# Patient Record
Sex: FEMALE | Race: BLACK OR AFRICAN AMERICAN
[De-identification: names, ages, dates, MRNs, and addresses within clinical notes are randomized per-mention and may not be internally consistent; named-entity substitution may affect disease eponyms.]

---

## 2017-03-04 NOTE — RAD
TWO VIEWS CHEST:

3/4/17

 

PROVIDED CLINICAL HISTORY:  

Cough.

 

FINDINGS:  

Comparison is made with the study dated 6/1/10. 

 

The cardiac and mediastinal silhouette is within normal limits. No focal consolidation, pleural flui
d, or pneumothorax apparent. Multiple bridging osteophytes involving the thoracic spine redemonstrat
ed, suggesting changes of DISH. 

 

IMPRESSION:  

No evidence for an acute cardiopulmonary process.

 

POS: [facility]

## 2017-07-11 NOTE — RAD
LEFT LEG TWO VIEWS:

 

History: Injury. 

 

FINDINGS: 

There are post op changes of total knee replacement. No acute fracture or dislocation is seen. 

 

POS: [facility]

## 2017-07-21 NOTE — RAD
LEFT ANKLE 3 VIEWS:

 

HISTORY: 

Fall, left ankle pain.

 

FINDINGS/IMPRESSION: 

No acute fracture or dislocation is seen.  The ankle mortise is maintained.  Soft tissue swelling is
 present.

 

POS: [facility]

## 2018-01-19 ENCOUNTER — HOSPITAL ENCOUNTER (OUTPATIENT)
Dept: HOSPITAL 92 - BICMAMMO | Age: 68
Discharge: HOME | End: 2018-01-19
Payer: MEDICARE

## 2018-01-19 DIAGNOSIS — N63.20: Primary | ICD-10-CM

## 2018-01-19 DIAGNOSIS — R92.8: ICD-10-CM

## 2018-01-19 PROCEDURE — G0279 TOMOSYNTHESIS, MAMMO: HCPCS

## 2018-01-19 PROCEDURE — 76642 ULTRASOUND BREAST LIMITED: CPT

## 2018-03-05 ENCOUNTER — HOSPITAL ENCOUNTER (OUTPATIENT)
Dept: HOSPITAL 9 - MADRAD | Age: 68
Discharge: HOME | End: 2018-03-05
Attending: FAMILY MEDICINE
Payer: MEDICARE

## 2018-03-05 DIAGNOSIS — M16.11: ICD-10-CM

## 2018-03-05 DIAGNOSIS — M25.551: Primary | ICD-10-CM

## 2018-03-05 DIAGNOSIS — M25.512: ICD-10-CM

## 2018-03-05 NOTE — RAD
LEFT SHOULDER 3 VIEWS:

 

HISTORY: 

Pain.  No trauma.

 

COMPARISON: 

None.

 

FINDINGS: 

There is no significant degenerative change.  No fracture or dislocation with respect to the proximal
 humerus.  There is a horizontally oriented lucency involving the scapula.  Findings may represent a 
nutrient channel.  Reference made to a chest radiograph from 3/4/17 does not demonstrate any similar 
finding.  Given the history of pain, the possibility of a nondisplaced fracture cannot be excluded.  
Better interrogation with CT is recommended.

 

IMPRESSION: 

Lucency involving the left scapula/glenoid.  Better interrogation with CT is recommended.

 

CODE T

 

POS: [facility]

## 2018-03-05 NOTE — RAD
RIGHT HIP:

Three views.

 

HISTORY:

Right hip pain for 2 days.

 

FINDINGS:

There are mild degenerative changes at the hip with mild spurring from the femoral head to mild media
l joint narrowing.  No fracture or focal osseous lesion seen.

 

IMPRESSION: 

Evidence of mild degenerative changes involving the right hip.  

 

POS: [facility]

## 2018-03-23 ENCOUNTER — HOSPITAL ENCOUNTER (EMERGENCY)
Dept: HOSPITAL 9 - MADERS | Age: 68
Discharge: HOME | End: 2018-03-23
Payer: MEDICARE

## 2018-03-23 DIAGNOSIS — E11.9: ICD-10-CM

## 2018-03-23 DIAGNOSIS — Z79.899: ICD-10-CM

## 2018-03-23 DIAGNOSIS — M17.11: Primary | ICD-10-CM

## 2018-03-23 DIAGNOSIS — I10: ICD-10-CM

## 2018-03-23 DIAGNOSIS — Z79.84: ICD-10-CM

## 2018-03-23 DIAGNOSIS — K21.9: ICD-10-CM

## 2018-03-23 PROCEDURE — 20610 DRAIN/INJ JOINT/BURSA W/O US: CPT

## 2018-04-07 ENCOUNTER — HOSPITAL ENCOUNTER (EMERGENCY)
Dept: HOSPITAL 9 - MADERS | Age: 68
Discharge: HOME | End: 2018-04-07
Payer: MEDICARE

## 2018-04-07 DIAGNOSIS — M62.838: Primary | ICD-10-CM

## 2018-04-07 DIAGNOSIS — Z79.82: ICD-10-CM

## 2018-04-07 DIAGNOSIS — K21.9: ICD-10-CM

## 2018-04-07 DIAGNOSIS — Z79.84: ICD-10-CM

## 2018-04-07 DIAGNOSIS — I10: ICD-10-CM

## 2018-04-07 DIAGNOSIS — Z79.899: ICD-10-CM

## 2018-04-07 DIAGNOSIS — E11.9: ICD-10-CM

## 2018-04-07 PROCEDURE — 71046 X-RAY EXAM CHEST 2 VIEWS: CPT

## 2018-04-07 NOTE — RAD
PA AND LATERAL CHEST X-RAY

4/7/18

 

HISTORY: 

Cough with left shoulder pain and neck pain. 

 

COMPARISON:  

3/4/17.

 

FINDINGS:  

The cardiac silhouette and pulmonary vasculature are within normal limits. Lungs remain clear. Vascul
ar calcifications are seen in the thoracic aorta. Degenerative changes are again seen in the spine wi
th multiple bridging osteophytes again present suggesting changes of DISH. There has been no interval
 change from the prior exam. 

 

IMPRESSION:  

Stable chest without evidence of an acute cardiopulmonary process. 

 

POS: [facility]

## 2018-04-13 ENCOUNTER — HOSPITAL ENCOUNTER (OUTPATIENT)
Dept: HOSPITAL 9 - MADLAB | Age: 68
Discharge: HOME | End: 2018-04-13
Payer: MEDICARE

## 2018-04-13 DIAGNOSIS — M17.11: Primary | ICD-10-CM

## 2018-04-13 DIAGNOSIS — D50.9: ICD-10-CM

## 2018-04-13 LAB
IRON SERPL-MCNC: 46 UG/DL (ref 50–170)
UIBC SERPL-MCNC: 255 MCG/DL (ref 265–497)
VIT B12 SERPL-MCNC: 1189 PG/ML (ref 211–911)

## 2018-04-13 PROCEDURE — 82607 VITAMIN B-12: CPT

## 2018-04-13 PROCEDURE — 83550 IRON BINDING TEST: CPT

## 2018-04-13 PROCEDURE — 82746 ASSAY OF FOLIC ACID SERUM: CPT

## 2018-04-13 PROCEDURE — 82728 ASSAY OF FERRITIN: CPT

## 2018-04-13 PROCEDURE — 83540 ASSAY OF IRON: CPT

## 2018-04-13 PROCEDURE — 36415 COLL VENOUS BLD VENIPUNCTURE: CPT

## 2018-04-13 NOTE — RAD
THREE VIEW RIGHT KNEE:

 

Clinical history: Pain, locking sensation. 

 

FINDINGS: 

There is endstage osteoarthrosis of the right knee with obliteration of the medial joint compartment 
and moderate narrowing of the lateral compartment. There is bulky osteophytosis in a tricompartmental
 distribution. Chondrocalcinosis is seen. Minimal joint gaseous destruction of the suprapatellar burs
a present. No acute fracture visualized. 

 

IMPRESSION: 

Endstage arthropathy of the right knee. 

 

POS: [facility]

## 2018-05-14 ENCOUNTER — HOSPITAL ENCOUNTER (OUTPATIENT)
Dept: HOSPITAL 9 - MADLABBHPM | Age: 68
Discharge: HOME | End: 2018-05-14
Attending: FAMILY MEDICINE
Payer: MEDICARE

## 2018-05-14 DIAGNOSIS — M54.2: Primary | ICD-10-CM

## 2018-05-14 DIAGNOSIS — E11.9: ICD-10-CM

## 2018-05-14 DIAGNOSIS — M47.892: ICD-10-CM

## 2018-05-14 DIAGNOSIS — M25.511: ICD-10-CM

## 2018-05-14 LAB
ALBUMIN SERPL BCG-MCNC: 3.7 G/DL (ref 3.4–4.8)
ALP SERPL-CCNC: 89 U/L (ref 40–150)
ALT SERPL W P-5'-P-CCNC: 7 U/L (ref 8–55)
ANION GAP SERPL CALC-SCNC: 18 MMOL/L (ref 10–20)
AST SERPL-CCNC: 10 U/L (ref 5–34)
BILIRUB SERPL-MCNC: 0.3 MG/DL (ref 0.2–1.2)
BUN SERPL-MCNC: 21 MG/DL (ref 9.8–20.1)
CALCIUM SERPL-MCNC: 10.6 MG/DL (ref 7.8–10.44)
CHD RISK SERPL-RTO: 3.5 (ref ?–4.5)
CHLORIDE SERPL-SCNC: 99 MMOL/L (ref 98–107)
CHOLEST SERPL-MCNC: 180 MG/DL
CO2 SERPL-SCNC: 26 MMOL/L (ref 23–31)
CREAT CL PREDICTED SERPL C-G-VRATE: 0 ML/MIN (ref 70–130)
GLOBULIN SER CALC-MCNC: 3.7 G/DL (ref 2.4–3.5)
GLUCOSE SERPL-MCNC: 144 MG/DL (ref 80–115)
HDLC SERPL-MCNC: 51 MG/DL
LDLC SERPL CALC-MCNC: 116 MG/DL
POTASSIUM SERPL-SCNC: 4.2 MMOL/L (ref 3.5–5.1)
SODIUM SERPL-SCNC: 139 MMOL/L (ref 136–145)
TRIGL SERPL-MCNC: 63 MG/DL (ref ?–150)

## 2018-05-14 PROCEDURE — 80053 COMPREHEN METABOLIC PANEL: CPT

## 2018-05-14 PROCEDURE — 80061 LIPID PANEL: CPT

## 2018-05-14 PROCEDURE — 36415 COLL VENOUS BLD VENIPUNCTURE: CPT

## 2018-05-14 PROCEDURE — 72050 X-RAY EXAM NECK SPINE 4/5VWS: CPT

## 2018-05-14 PROCEDURE — 83036 HEMOGLOBIN GLYCOSYLATED A1C: CPT

## 2018-05-14 PROCEDURE — 82043 UR ALBUMIN QUANTITATIVE: CPT

## 2018-05-14 NOTE — RAD
RIGHT SHOULDER TWO VIEWS:

 

History: Right shoulder pain. 

 

FINDINGS: 

Degenerative changes are present. No acute fracture, dislocation or bony destruction is seen. Calcifi
c densities in the acromiohumeral space and in the inframedial aspect of the head of the right humeru
s which may represent loose bodies. 

 

POS: [facility]

## 2018-05-14 NOTE — RAD
CERVICAL SPINE FOUR VIEWS:

 

History: Chronic neck pain. 

 

FINDINGS: 

Cervical vertebrae maintain height and alignment. There are moderate degenerative changes present. Lo
ss of disc space is noted at C4-5 and C5-6. Prominent anterior osteophytes are noted at C4-5, C5-6, a
nd C6-7. Posterior spondolytic changes are noted at C4-5, C5-6. Facet hypertrophy is noted. 

 

IMPRESSION: 

Moderate degenerative change of the cervical spine. 

 

[facility]: [facility]

## 2018-06-01 ENCOUNTER — HOSPITAL ENCOUNTER (OUTPATIENT)
Dept: HOSPITAL 92 - BICMRI | Age: 68
Discharge: HOME | End: 2018-06-01
Payer: MEDICARE

## 2018-06-01 DIAGNOSIS — M19.011: ICD-10-CM

## 2018-06-01 DIAGNOSIS — S46.912A: ICD-10-CM

## 2018-06-01 DIAGNOSIS — M47.892: ICD-10-CM

## 2018-06-01 DIAGNOSIS — M19.012: ICD-10-CM

## 2018-06-01 DIAGNOSIS — M75.21: ICD-10-CM

## 2018-06-01 DIAGNOSIS — M54.2: ICD-10-CM

## 2018-06-01 DIAGNOSIS — M62.512: ICD-10-CM

## 2018-06-01 DIAGNOSIS — D17.79: ICD-10-CM

## 2018-06-01 DIAGNOSIS — M75.101: ICD-10-CM

## 2018-06-01 DIAGNOSIS — M99.81: ICD-10-CM

## 2018-06-01 DIAGNOSIS — M75.102: Primary | ICD-10-CM

## 2018-06-01 DIAGNOSIS — M25.512: ICD-10-CM

## 2018-06-01 DIAGNOSIS — M48.02: ICD-10-CM

## 2018-06-01 DIAGNOSIS — M25.511: ICD-10-CM

## 2018-06-01 DIAGNOSIS — M75.92: ICD-10-CM

## 2018-06-01 PROCEDURE — 72141 MRI NECK SPINE W/O DYE: CPT

## 2018-06-29 ENCOUNTER — HOSPITAL ENCOUNTER (EMERGENCY)
Dept: HOSPITAL 9 - MADERS | Age: 68
Discharge: HOME | End: 2018-06-29
Payer: MEDICARE

## 2018-06-29 DIAGNOSIS — Z79.899: ICD-10-CM

## 2018-06-29 DIAGNOSIS — Z79.82: ICD-10-CM

## 2018-06-29 DIAGNOSIS — I10: ICD-10-CM

## 2018-06-29 DIAGNOSIS — K21.9: ICD-10-CM

## 2018-06-29 DIAGNOSIS — M15.1: Primary | ICD-10-CM

## 2018-06-29 NOTE — RAD
LEFT HAND 2 VIEWS:

 

Date:  06/29/18 

 

INDICATION:

Swelling and pain DIP joints. Third DIP joint especially painful. 

 

FINDINGS:

The scaphoid and trapezium are absent, presumably from prior surgery. Recommend clinical correlation.
 There are degenerative changes at the trapezoid and first metacarpal. There is fusion of the lunate 
and capitate. There is bony fusion of the first MCP joint. 

 

The other MCP joints show joint narrowing without significant erosive change. 

 

Prominent degenerative changes are seen in the IP joints of all fingers. IP joint of the thumb shows 
joint narrowing and hypertrophic spurring. Hypertrophic spurring seen at all IP joints with findings 
very prominent at the DIP joints of the second and third digits. The degenerative hypertrophic change
s are most pronounced at the DIP joint of the third digit. 

 

IMPRESSION: 

1.  Degenerative changes of the IP joints as described above, most pronounced at the DIP joint of the
 third digit. 

 

2.  Fusion of first MCP joint and evidence of prior resection of the scaphoid and trapezium. 

 

 

POS: [facility]

## 2018-08-17 ENCOUNTER — HOSPITAL ENCOUNTER (EMERGENCY)
Dept: HOSPITAL 9 - MADERS | Age: 68
Discharge: HOME | End: 2018-08-17
Payer: MEDICARE

## 2018-08-17 DIAGNOSIS — K21.9: ICD-10-CM

## 2018-08-17 DIAGNOSIS — Z79.84: ICD-10-CM

## 2018-08-17 DIAGNOSIS — E11.9: ICD-10-CM

## 2018-08-17 DIAGNOSIS — Z79.899: ICD-10-CM

## 2018-08-17 DIAGNOSIS — Z79.82: ICD-10-CM

## 2018-08-17 DIAGNOSIS — M25.561: Primary | ICD-10-CM

## 2018-08-17 DIAGNOSIS — I10: ICD-10-CM

## 2018-08-17 DIAGNOSIS — M19.90: ICD-10-CM

## 2018-08-17 NOTE — RAD
RIGHT KNEE FOUR VIEWS:

 

HISTORY:

Right knee pain.

 

FINDINGS:

Tricompartmental degenerative changes are present, manifested by osteophyte formation and joint space
 narrowing.  No fracture, dislocation, or bony destruction is identified.

 

IMPRESSION:

Right knee osteoarthritis.

 

POS: [facility]

## 2019-03-19 ENCOUNTER — HOSPITAL ENCOUNTER (OUTPATIENT)
Dept: HOSPITAL 92 - BICMAMMO | Age: 69
Discharge: HOME | End: 2019-03-19
Attending: FAMILY MEDICINE
Payer: MEDICARE

## 2019-03-19 DIAGNOSIS — Z12.31: Primary | ICD-10-CM

## 2019-03-19 DIAGNOSIS — Z91.89: ICD-10-CM

## 2019-03-19 DIAGNOSIS — Z80.3: ICD-10-CM

## 2019-03-19 PROCEDURE — 77063 BREAST TOMOSYNTHESIS BI: CPT

## 2019-03-19 PROCEDURE — 77067 SCR MAMMO BI INCL CAD: CPT

## 2019-03-25 NOTE — MMO
Bilateral MAMMO Bilat Screen [facility].

 

CLINICAL HISTORY:

Patient is 68 years old and is seen for screening. The patient has the following

family history of breast cancer:  sister.  The patient has no personal history

of cancer. The patient has a history of right Excisional Biopsy - benign.

 

VIEWS:

The views performed were:  bilateral craniocaudal with tomosynthesis; bilateral

mediolateral oblique with tomosynthesis; and left mediolateral oblique.

 

FILMS COMPARED:

The present examination has been compared to prior imaging studies performed at

[facility] on 02/15/2012, 04/21/2015 and

05/10/2016, at [facility] on 01/19/2018, and at [facility] on 09/22/2017.

 

MAMMOGRAM FINDINGS:

There are scattered fibroglandular densities.

 

Finding 1:  There are stable masses seen in both breasts.

 

Finding 2:  There are two biopsy clips seen in the right breast.

 

Finding 3:  There are benign appearing calcifications seen in both breasts.

 

There are no suspicious masses, calcifications or areas of architectural

distortion.

 

IMPRESSION:

THERE IS NO MAMMOGRAPHIC EVIDENCE OF MALIGNANCY.

 

A ROUTINE FOLLOW-UP MAMMOGRAM IN 1 YEAR IS RECOMMENDED.

 

THE RESULTS OF THIS EXAM WERE SENT TO THE PATIENT.

 

ACR BI-RADS Category 2 - Benign finding

 

MAMMOGRAPHY NOTE:

 1. A negative mammogram report should not delay a biopsy if a dominant of

 clinically suspicious mass is present.

 2. Approximately 10% to 15% of breast cancers are not detected by

 mammography.

 3. Adenosis and dense breasts may obscure an underlying neoplasm.

## 2020-06-16 ENCOUNTER — HOSPITAL ENCOUNTER (OUTPATIENT)
Dept: HOSPITAL 92 - BICMAMMO | Age: 70
Discharge: HOME | End: 2020-06-16
Attending: FAMILY MEDICINE
Payer: MEDICARE

## 2020-06-16 DIAGNOSIS — Z12.31: Primary | ICD-10-CM

## 2020-06-16 DIAGNOSIS — Z91.89: ICD-10-CM

## 2020-06-16 DIAGNOSIS — Z80.3: ICD-10-CM

## 2020-06-16 PROCEDURE — 77067 SCR MAMMO BI INCL CAD: CPT

## 2020-06-16 PROCEDURE — 77063 BREAST TOMOSYNTHESIS BI: CPT

## 2020-06-16 NOTE — MMO
Bilateral MAMMO Bilat Screen [facility].

 

CLINICAL HISTORY:

Patient is 70 years old and is seen for screening. The patient has the following

family history of breast cancer:  sister.  The patient has no personal history

of cancer. The patient has a history of right Excisional Biopsy - benign.

 

VIEWS:

The views performed were:  bilateral craniocaudal with tomosynthesis; bilateral

mediolateral oblique with tomosynthesis; and right craniocaudal.

 

FILMS COMPARED:

The present examination has been compared to prior imaging studies performed at

[facility] on 05/10/2016, at [facility] on

01/19/2018 and 03/19/2019, and at [facility] on 09/22/2017.

 

This study has been interpreted with the assistance of computer-aided detection.

 

MAMMOGRAM FINDINGS:

Finding 1:  There are stable masses seen in both breasts.

 

Finding 2:  There are two biopsy clips seen in the right breast.

 

Finding 3:  There are benign appearing calcifications seen in both breasts.

 

There are no suspicious masses, suspicious calcifications, or new areas of

architectural distortion.

 

IMPRESSION:

THERE IS NO MAMMOGRAPHIC EVIDENCE OF MALIGNANCY.

 

A ROUTINE FOLLOW-UP MAMMOGRAM IN 1 YEAR IS RECOMMENDED.

 

THE RESULTS OF THIS EXAM WERE SENT TO THE PATIENT.

 

ACR BI-RADS Category 2 - Benign finding

 

MAMMOGRAPHY NOTE:

 1. A negative mammogram report should not delay a biopsy if a dominant of

 clinically suspicious mass is present.

 2. Approximately 10% to 15% of breast cancers are not detected by

 mammography.

 3. Adenosis and dense breasts may obscure an underlying neoplasm.

 

 

Reported by: MONICA ZHU MD

Electonically Signed: [id number]

## 2021-06-17 ENCOUNTER — HOSPITAL ENCOUNTER (EMERGENCY)
Dept: HOSPITAL 9 - MADERS | Age: 71
LOS: 1 days | Discharge: ANOTHER HOSPITAL | End: 2021-06-18
Payer: MEDICARE

## 2021-06-17 DIAGNOSIS — D50.9: ICD-10-CM

## 2021-06-17 DIAGNOSIS — I26.93: Primary | ICD-10-CM

## 2021-06-17 DIAGNOSIS — E11.9: ICD-10-CM

## 2021-06-17 DIAGNOSIS — Z79.84: ICD-10-CM

## 2021-06-17 DIAGNOSIS — M19.90: ICD-10-CM

## 2021-06-17 DIAGNOSIS — Z79.899: ICD-10-CM

## 2021-06-17 DIAGNOSIS — I10: ICD-10-CM

## 2021-06-17 DIAGNOSIS — Z79.82: ICD-10-CM

## 2021-06-17 DIAGNOSIS — K21.9: ICD-10-CM

## 2021-06-17 LAB
ALBUMIN SERPL BCG-MCNC: 4.1 G/DL (ref 3.4–4.8)
ALP SERPL-CCNC: 121 U/L (ref 40–110)
ALT SERPL W P-5'-P-CCNC: 7 U/L (ref 8–55)
ANION GAP SERPL CALC-SCNC: 15 MMOL/L (ref 10–20)
AST SERPL-CCNC: 15 U/L (ref 5–34)
BILIRUB SERPL-MCNC: 0.3 MG/DL (ref 0.2–1.2)
BUN SERPL-MCNC: 28 MG/DL (ref 9.8–20.1)
CALCIUM SERPL-MCNC: 10.1 MG/DL (ref 7.8–10.44)
CHLORIDE SERPL-SCNC: 105 MMOL/L (ref 98–107)
CK MB SERPL-MCNC: 0.8 NG/ML (ref 0–6.6)
CK SERPL-CCNC: 59 U/L (ref 29–168)
CO2 SERPL-SCNC: 22 MMOL/L (ref 23–31)
CREAT CL PREDICTED SERPL C-G-VRATE: 0 ML/MIN (ref 70–130)
GIANT PLATELETS BLD QL SMEAR: SLIGHT
GLOBULIN SER CALC-MCNC: 4.3 G/DL (ref 2.4–3.5)
GLUCOSE SERPL-MCNC: 114 MG/DL (ref 83–110)
HGB BLD-MCNC: 9.2 G/DL (ref 12–16)
MCH RBC QN AUTO: 21.1 PG (ref 27–31)
MCV RBC AUTO: 76 FL (ref 78–98)
MDIFF COMPLETE?: YES
OVALOCYTES BLD QL SMEAR: (no result) (100X)
PLATELET # BLD AUTO: 289 THOU/UL (ref 130–400)
POTASSIUM SERPL-SCNC: 4.2 MMOL/L (ref 3.5–5.1)
RBC # BLD AUTO: 4.37 MILL/UL (ref 4.2–5.4)
SODIUM SERPL-SCNC: 138 MMOL/L (ref 136–145)
WBC # BLD AUTO: 6.8 THOU/UL (ref 4.8–10.8)

## 2021-06-17 PROCEDURE — 82553 CREATINE MB FRACTION: CPT

## 2021-06-17 PROCEDURE — 93005 ELECTROCARDIOGRAM TRACING: CPT

## 2021-06-17 PROCEDURE — 80053 COMPREHEN METABOLIC PANEL: CPT

## 2021-06-17 PROCEDURE — 85025 COMPLETE CBC W/AUTO DIFF WBC: CPT

## 2021-06-17 PROCEDURE — 71046 X-RAY EXAM CHEST 2 VIEWS: CPT

## 2021-06-17 PROCEDURE — 82550 ASSAY OF CK (CPK): CPT

## 2021-06-17 PROCEDURE — 71275 CT ANGIOGRAPHY CHEST: CPT

## 2021-06-17 PROCEDURE — 85379 FIBRIN DEGRADATION QUANT: CPT

## 2021-06-17 PROCEDURE — 84484 ASSAY OF TROPONIN QUANT: CPT

## 2021-06-24 ENCOUNTER — HOSPITAL ENCOUNTER (OUTPATIENT)
Dept: HOSPITAL 92 - BICMAMMO | Age: 71
Discharge: HOME | End: 2021-06-24
Attending: FAMILY MEDICINE
Payer: MEDICARE

## 2021-06-24 DIAGNOSIS — Z80.3: ICD-10-CM

## 2021-06-24 DIAGNOSIS — Z12.31: Primary | ICD-10-CM

## 2021-06-24 DIAGNOSIS — Z91.89: ICD-10-CM

## 2021-06-24 PROCEDURE — 77067 SCR MAMMO BI INCL CAD: CPT

## 2021-06-24 PROCEDURE — 77063 BREAST TOMOSYNTHESIS BI: CPT

## 2021-10-05 ENCOUNTER — HOSPITAL ENCOUNTER (EMERGENCY)
Dept: HOSPITAL 9 - MADERS | Age: 71
LOS: 1 days | Discharge: HOME | End: 2021-10-06
Payer: MEDICARE

## 2021-10-05 DIAGNOSIS — S63.601A: Primary | ICD-10-CM

## 2021-10-05 DIAGNOSIS — E11.9: ICD-10-CM

## 2021-10-05 DIAGNOSIS — I10: ICD-10-CM

## 2021-10-05 DIAGNOSIS — Z79.84: ICD-10-CM

## 2021-10-05 DIAGNOSIS — M16.11: ICD-10-CM

## 2021-10-05 DIAGNOSIS — S70.01XA: ICD-10-CM

## 2021-10-05 DIAGNOSIS — Z79.899: ICD-10-CM

## 2021-10-05 DIAGNOSIS — Z86.711: ICD-10-CM
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
## 2021-10-05 PROCEDURE — 70450 CT HEAD/BRAIN W/O DYE: CPT

## 2022-02-13 ENCOUNTER — HOSPITAL ENCOUNTER (EMERGENCY)
Dept: HOSPITAL 9 - MADERS | Age: 72
Discharge: HOME | End: 2022-02-13
Payer: MEDICARE

## 2022-02-13 DIAGNOSIS — E11.9: ICD-10-CM

## 2022-02-13 DIAGNOSIS — Z86.711: ICD-10-CM

## 2022-02-13 DIAGNOSIS — M54.41: Primary | ICD-10-CM

## 2022-02-13 DIAGNOSIS — I10: ICD-10-CM

## 2022-02-13 DIAGNOSIS — K21.9: ICD-10-CM

## 2022-02-13 DIAGNOSIS — M19.90: ICD-10-CM

## 2022-02-13 PROCEDURE — 99283 EMERGENCY DEPT VISIT LOW MDM: CPT

## 2022-07-21 ENCOUNTER — HOSPITAL ENCOUNTER (OUTPATIENT)
Dept: HOSPITAL 92 - BICMAMMO | Age: 72
Discharge: HOME | End: 2022-07-21
Attending: FAMILY MEDICINE
Payer: MEDICARE

## 2022-07-21 DIAGNOSIS — Z12.31: Primary | ICD-10-CM

## 2022-07-21 DIAGNOSIS — Z80.3: ICD-10-CM

## 2022-07-21 DIAGNOSIS — Z91.89: ICD-10-CM

## 2022-07-21 PROCEDURE — 77067 SCR MAMMO BI INCL CAD: CPT

## 2022-07-21 PROCEDURE — 77063 BREAST TOMOSYNTHESIS BI: CPT

## 2022-12-20 ENCOUNTER — HOSPITAL ENCOUNTER (EMERGENCY)
Dept: HOSPITAL 9 - MADERS | Age: 72
Discharge: ANOTHER HOSPITAL | End: 2022-12-20
Payer: COMMERCIAL

## 2022-12-20 DIAGNOSIS — K21.9: ICD-10-CM

## 2022-12-20 DIAGNOSIS — Z79.82: ICD-10-CM

## 2022-12-20 DIAGNOSIS — M19.90: ICD-10-CM

## 2022-12-20 DIAGNOSIS — E11.9: ICD-10-CM

## 2022-12-20 DIAGNOSIS — M79.662: Primary | ICD-10-CM

## 2022-12-20 DIAGNOSIS — Z86.711: ICD-10-CM

## 2022-12-20 DIAGNOSIS — Z79.84: ICD-10-CM

## 2022-12-20 DIAGNOSIS — Z79.899: ICD-10-CM

## 2022-12-20 DIAGNOSIS — I10: ICD-10-CM

## 2022-12-20 LAB
ANION GAP SERPL CALC-SCNC: 14 MMOL/L (ref 10–20)
BASOPHILS # BLD AUTO: 0 THOU/UL (ref 0–0.2)
BASOPHILS NFR BLD AUTO: 0.6 % (ref 0–1)
BUN SERPL-MCNC: 24 MG/DL (ref 9.8–20.1)
CALCIUM SERPL-MCNC: 9.5 MG/DL (ref 7.8–10.44)
CHLORIDE SERPL-SCNC: 108 MMOL/L (ref 98–107)
CO2 SERPL-SCNC: 25 MMOL/L (ref 23–31)
CREAT CL PREDICTED SERPL C-G-VRATE: 0 ML/MIN (ref 70–130)
EOSINOPHIL # BLD AUTO: 0.2 THOU/UL (ref 0–0.7)
EOSINOPHIL NFR BLD AUTO: 3.5 % (ref 0–10)
GLUCOSE SERPL-MCNC: 159 MG/DL (ref 83–110)
HGB BLD-MCNC: 9.9 G/DL (ref 12–16)
LYMPHOCYTES # BLD AUTO: 1.7 THOU/UL (ref 1.2–3.4)
LYMPHOCYTES NFR BLD AUTO: 30.2 % (ref 21–51)
MCH RBC QN AUTO: 22.4 PG (ref 27–31)
MCV RBC AUTO: 74.2 FL (ref 78–98)
MDIFF COMPLETE?: YES
MICROCYTES BLD QL SMEAR: (no result) (100X)
MONOCYTES # BLD AUTO: 0.6 THOU/UL (ref 0.11–0.59)
MONOCYTES NFR BLD AUTO: 10.3 % (ref 0–10)
NEUTROPHILS # BLD AUTO: 3 THOU/UL (ref 1.4–6.5)
NEUTROPHILS NFR BLD AUTO: 55.4 % (ref 42–75)
PLATELET # BLD AUTO: 237 10X3/UL (ref 130–400)
POTASSIUM SERPL-SCNC: 4.5 MMOL/L (ref 3.5–5.1)
RBC # BLD AUTO: 4.41 MILL/UL (ref 4.2–5.4)
SODIUM SERPL-SCNC: 142 MMOL/L (ref 136–145)
WBC # BLD AUTO: 5.5 10X3/UL (ref 4.8–10.8)

## 2022-12-20 PROCEDURE — 85379 FIBRIN DEGRADATION QUANT: CPT

## 2022-12-20 PROCEDURE — 85025 COMPLETE CBC W/AUTO DIFF WBC: CPT

## 2022-12-20 PROCEDURE — 80048 BASIC METABOLIC PNL TOTAL CA: CPT

## 2022-12-20 PROCEDURE — 36415 COLL VENOUS BLD VENIPUNCTURE: CPT

## 2023-03-09 ENCOUNTER — HOSPITAL ENCOUNTER (EMERGENCY)
Dept: HOSPITAL 9 - MADERS | Age: 73
Discharge: HOME | End: 2023-03-09
Payer: MEDICAID
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
## 2023-03-09 DIAGNOSIS — S92.522A: Primary | ICD-10-CM

## 2023-07-28 ENCOUNTER — HOSPITAL ENCOUNTER (EMERGENCY)
Dept: HOSPITAL 9 - MADERS | Age: 73
Discharge: HOME | End: 2023-07-28
Payer: COMMERCIAL

## 2023-07-28 DIAGNOSIS — J02.9: Primary | ICD-10-CM

## 2023-07-28 DIAGNOSIS — Z79.84: ICD-10-CM

## 2023-07-28 DIAGNOSIS — E11.9: ICD-10-CM

## 2023-07-28 DIAGNOSIS — I10: ICD-10-CM

## 2023-07-28 DIAGNOSIS — Z79.82: ICD-10-CM

## 2023-07-28 DIAGNOSIS — K21.9: ICD-10-CM

## 2023-07-28 PROCEDURE — 99283 EMERGENCY DEPT VISIT LOW MDM: CPT

## 2023-07-28 PROCEDURE — 87081 CULTURE SCREEN ONLY: CPT

## 2023-07-28 PROCEDURE — 87430 STREP A AG IA: CPT

## 2023-09-18 ENCOUNTER — HOSPITAL ENCOUNTER (OUTPATIENT)
Dept: HOSPITAL 92 - BICMAMMO | Age: 73
Discharge: HOME | End: 2023-09-18
Attending: FAMILY MEDICINE
Payer: COMMERCIAL

## 2023-09-18 DIAGNOSIS — Z80.3: ICD-10-CM

## 2023-09-18 DIAGNOSIS — Z91.89: ICD-10-CM

## 2023-09-18 DIAGNOSIS — Z12.31: Primary | ICD-10-CM

## 2023-09-18 PROCEDURE — 77063 BREAST TOMOSYNTHESIS BI: CPT

## 2023-09-18 PROCEDURE — 77067 SCR MAMMO BI INCL CAD: CPT

## 2024-03-22 ENCOUNTER — HOSPITAL ENCOUNTER (EMERGENCY)
Dept: HOSPITAL 9 - MADERS | Age: 74
Discharge: HOME | End: 2024-03-22
Payer: MEDICARE

## 2024-03-22 DIAGNOSIS — M12.9: Primary | ICD-10-CM

## 2024-03-22 DIAGNOSIS — E11.9: ICD-10-CM

## 2024-03-22 DIAGNOSIS — I10: ICD-10-CM

## 2024-03-22 DIAGNOSIS — Z79.82: ICD-10-CM

## 2024-03-22 DIAGNOSIS — Z79.84: ICD-10-CM

## 2024-03-22 DIAGNOSIS — I26.99: ICD-10-CM
